# Patient Record
Sex: FEMALE | HISPANIC OR LATINO | Employment: FULL TIME | URBAN - METROPOLITAN AREA
[De-identification: names, ages, dates, MRNs, and addresses within clinical notes are randomized per-mention and may not be internally consistent; named-entity substitution may affect disease eponyms.]

---
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
## 2024-11-18 ENCOUNTER — OFFICE VISIT (OUTPATIENT)
Dept: URGENT CARE | Facility: URGENT CARE | Age: 24
End: 2024-11-18
Payer: COMMERCIAL

## 2024-11-18 ENCOUNTER — ANCILLARY PROCEDURE (OUTPATIENT)
Dept: GENERAL RADIOLOGY | Facility: CLINIC | Age: 24
End: 2024-11-18
Attending: PHYSICIAN ASSISTANT
Payer: COMMERCIAL

## 2024-11-18 VITALS
RESPIRATION RATE: 16 BRPM | OXYGEN SATURATION: 98 % | HEART RATE: 85 BPM | SYSTOLIC BLOOD PRESSURE: 118 MMHG | TEMPERATURE: 98.2 F | DIASTOLIC BLOOD PRESSURE: 84 MMHG

## 2024-11-18 DIAGNOSIS — R22.1 NECK SWELLING: Primary | ICD-10-CM

## 2024-11-18 DIAGNOSIS — R22.1 NECK SWELLING: ICD-10-CM

## 2024-11-18 DIAGNOSIS — M54.9 UPPER BACK PAIN ON LEFT SIDE: ICD-10-CM

## 2024-11-18 LAB
BASOPHILS # BLD AUTO: 0 10E3/UL (ref 0–0.2)
BASOPHILS NFR BLD AUTO: 0 %
EOSINOPHIL # BLD AUTO: 0 10E3/UL (ref 0–0.7)
EOSINOPHIL NFR BLD AUTO: 0 %
ERYTHROCYTE [DISTWIDTH] IN BLOOD BY AUTOMATED COUNT: 15.8 % (ref 10–15)
HCT VFR BLD AUTO: 41.8 % (ref 35–47)
HGB BLD-MCNC: 14 G/DL (ref 11.7–15.7)
IMM GRANULOCYTES # BLD: 0 10E3/UL
IMM GRANULOCYTES NFR BLD: 0 %
LYMPHOCYTES # BLD AUTO: 3.9 10E3/UL (ref 0.8–5.3)
LYMPHOCYTES NFR BLD AUTO: 36 %
MCH RBC QN AUTO: 28.3 PG (ref 26.5–33)
MCHC RBC AUTO-ENTMCNC: 33.5 G/DL (ref 31.5–36.5)
MCV RBC AUTO: 84 FL (ref 78–100)
MONOCYTES # BLD AUTO: 0.5 10E3/UL (ref 0–1.3)
MONOCYTES NFR BLD AUTO: 4 %
NEUTROPHILS # BLD AUTO: 6.5 10E3/UL (ref 1.6–8.3)
NEUTROPHILS NFR BLD AUTO: 59 %
PLATELET # BLD AUTO: 286 10E3/UL (ref 150–450)
RBC # BLD AUTO: 4.95 10E6/UL (ref 3.8–5.2)
WBC # BLD AUTO: 11 10E3/UL (ref 4–11)

## 2024-11-18 RX ORDER — METHYLPREDNISOLONE 4 MG/1
TABLET ORAL
Qty: 21 TABLET | Refills: 0 | Status: SHIPPED | OUTPATIENT
Start: 2024-11-18

## 2024-11-18 RX ORDER — METHOCARBAMOL 750 MG/1
750 TABLET, FILM COATED ORAL 4 TIMES DAILY PRN
Qty: 30 TABLET | Refills: 0 | Status: SHIPPED | OUTPATIENT
Start: 2024-11-18

## 2024-11-19 LAB
T4 FREE SERPL-MCNC: <0.1 NG/DL (ref 0.9–1.7)
TSH SERPL DL<=0.005 MIU/L-ACNC: 99.97 UIU/ML (ref 0.3–4.2)

## 2024-11-19 NOTE — PROGRESS NOTES
Assessment & Plan     Neck swelling    Patient has notived anterior neck swelling for a couple wks  She is not having pain in this area  No problems breathing or swallowing  She has crispin taking Tylenol for swelling she says    Her sister had this problems and was found to have a thyroid problems    TSH pending  CBC neg  Neck soft tissue xray Negative for acute findings, read by Rene REBOLLAR at time of visit.    - TSH with free T4 reflex  - CBC with platelets and differential  - XR Neck Soft Tissue  - TSH with free T4 reflex  - CBC with platelets and differential    Upper back pain on left side    Back pain is extremely common.  Its important to avoid using heating pads, but alternating ice and warm moist compresses are helpful.  Its usually best to try to return to your daily routine as soon as possible.  Stretching and walking to help relieve your discomfort is helpful.  Many times back pain and muscle strains will worsen for the first 3-4 days and then settle down the following 3 to 4 days.  Take medication as prescribed and pay attention as some medications can cause drowsiness.  Over time you will want to slowly increase the amount of time you walk or stretch.   Expect discomfort when you first start, but this should improved as your back starts to recover and become stronger.  Use pain as your guide, if it hurts you are not ready for that activity.  Work back into activity gradually and return to activity as tolerated.      Medrol for neck swelling and back pain  Robaxin for upper back tightness       Referral to ENT fo neck swelling    No follow-ups on file.    Rene Patterson, [facility], [place]  [facility] Deleon is a 24 year old female who presents to clinic today for the following health issues:  Chief Complaint   Patient presents with    Back Pain     Pt has been dealing with upper back pain for the last month--Pt has also now been having all over body pains and  swollen lymph node/neck-concerned of thyroid issues which is common in her family       HPI  Review of Systems  Constitutional, HEENT, cardiovascular, pulmonary, GI, , musculoskeletal, neuro, skin, endocrine and psych systems are negative, except as otherwise noted.      Objective    /84   Pulse 85   Temp 98.2  F (36.8  C) (Tympanic)   Resp 16   SpO2 98%   Physical Exam   GENERAL: alert and no distress  EYES: Eyes grossly normal to inspection, PERRL and conjunctivae and sclerae normal  HENT: ear canals and TM's normal, nose and mouth without ulcers or lesions  NECK: no adenopathy, no asymmetry, masses, or scars, and pos for anterior neck swelling  RESP: lungs clear to auscultation - no rales, rhonchi or wheezes  CV: regular rate and rhythm, normal S1 S2, no S3 or S4, no murmur, click or rub, no peripheral edema  MS: pos for left upper back tightness and tenderness  SKIN: no suspicious lesions or rashes  NEURO: Normal strength and tone, mentation intact and speech normal  PSYCH: mentation appears normal, affect normal/bright

## 2024-11-21 DIAGNOSIS — E03.9 HYPOTHYROIDISM, UNSPECIFIED TYPE: Primary | ICD-10-CM

## 2024-11-21 RX ORDER — LEVOTHYROXINE SODIUM 25 UG/1
25 TABLET ORAL DAILY
Qty: 90 TABLET | Refills: 0 | Status: SHIPPED | OUTPATIENT
Start: 2024-11-21 | End: 2025-02-19

## 2024-11-23 ENCOUNTER — HEALTH MAINTENANCE LETTER (OUTPATIENT)
Age: 24
End: 2024-11-23